# Patient Record
Sex: FEMALE | Race: WHITE | Employment: FULL TIME | URBAN - METROPOLITAN AREA
[De-identification: names, ages, dates, MRNs, and addresses within clinical notes are randomized per-mention and may not be internally consistent; named-entity substitution may affect disease eponyms.]

---

## 2017-01-26 ENCOUNTER — OFFICE VISIT (OUTPATIENT)
Dept: DERMATOLOGY CLINIC | Facility: CLINIC | Age: 42
End: 2017-01-26

## 2017-01-26 DIAGNOSIS — D48.5 NEOPLASM OF UNCERTAIN BEHAVIOR OF SKIN: Primary | ICD-10-CM

## 2017-01-26 PROCEDURE — 11100 BIOPSY OF SKIN LESION: CPT | Performed by: DERMATOLOGY

## 2017-01-26 PROCEDURE — 88305 TISSUE EXAM BY PATHOLOGIST: CPT | Performed by: DERMATOLOGY

## 2017-01-26 NOTE — PROGRESS NOTES
HPI:     Chief Complaint     Derm Problem        HPI     Derm Problem    Additional comments: crusty spot on face, left cheek has returned. Denies pain/itching, crusting gets worse as the day goes on. Normal moisturizer is only product.        Last edited of Onset   • Hypertension Mother 79   • Hypertension Father 79   • Gastro-Intestinal Disorder Mother      Diverticulosis   • Musculo-skelatal Disorder Mother      Hip replacement   • Cataracts Father    • Heart Disease Father      Coronary artery disease

## 2017-01-31 NOTE — PROGRESS NOTES
Quick Note:    1/26/2017 derm path report results logged in log book and letter sent to pt via [address] noting that lesion removed was benign and to f/u with [facility] as needed.   ______

## 2020-09-15 ENCOUNTER — OFFICE VISIT (OUTPATIENT)
Dept: OBGYN CLINIC | Facility: CLINIC | Age: 45
End: 2020-09-15
Payer: COMMERCIAL

## 2020-09-15 VITALS
SYSTOLIC BLOOD PRESSURE: 119 MMHG | HEART RATE: 51 BPM | BODY MASS INDEX: 25 KG/M2 | DIASTOLIC BLOOD PRESSURE: 79 MMHG | WEIGHT: 142 LBS

## 2020-09-15 DIAGNOSIS — N93.0 PCB (POST COITAL BLEEDING): ICD-10-CM

## 2020-09-15 DIAGNOSIS — Z01.419 ENCOUNTER FOR GYNECOLOGICAL EXAMINATION WITHOUT ABNORMAL FINDING: Primary | ICD-10-CM

## 2020-09-15 DIAGNOSIS — Z11.3 VENEREAL DISEASE SCREENING: ICD-10-CM

## 2020-09-15 DIAGNOSIS — Z12.31 SCREENING MAMMOGRAM, ENCOUNTER FOR: ICD-10-CM

## 2020-09-15 DIAGNOSIS — Z12.4 SCREENING FOR MALIGNANT NEOPLASM OF CERVIX: ICD-10-CM

## 2020-09-15 PROCEDURE — 99386 PREV VISIT NEW AGE 40-64: CPT | Performed by: OBSTETRICS & GYNECOLOGY

## 2020-09-15 PROCEDURE — 3074F SYST BP LT 130 MM HG: CPT | Performed by: OBSTETRICS & GYNECOLOGY

## 2020-09-15 PROCEDURE — 3078F DIAST BP <80 MM HG: CPT | Performed by: OBSTETRICS & GYNECOLOGY

## 2020-09-16 NOTE — PROGRESS NOTES
HPI:    Patient ID: Bridget Gaucher is a 39year old female. HPI  [id number] with monthly menses and condom for [facility]. She is in new relationship since January. He has no children. She wishes to discuss [facility] options. She has never used anything but condom.  She is no rash or lesion on the right labia. There is no rash or lesion on the left labia. Uterus is not enlarged and not tender. Cervix exhibits no motion tenderness and no discharge. Right adnexum displays no mass, no tenderness and no fullness.  Left adnexum

## 2020-09-17 LAB
C TRACH DNA SPEC QL NAA+PROBE: NEGATIVE
HPV I/H RISK 1 DNA SPEC QL NAA+PROBE: NEGATIVE
N GONORRHOEA DNA SPEC QL NAA+PROBE: NEGATIVE
T VAGINALIS RRNA SPEC QL NAA+PROBE: NEGATIVE

## 2020-09-18 NOTE — PROGRESS NOTES
Leanna Burr. You have negative pap and HPV test. The cervical STD screen is all negative as well. Continue yearly exam and do pap in 3 years.

## 2020-09-29 ENCOUNTER — PATIENT MESSAGE (OUTPATIENT)
Dept: OBGYN CLINIC | Facility: CLINIC | Age: 45
End: 2020-09-29

## 2020-09-30 NOTE — TELEPHONE ENCOUNTER
Pt requesting appt for Mirena insertion. Pt reports lmp 9/29/2020. Assisted pt with scheduling appt for 10/2/2020 at 1140am at [facility]. Advised pt to take 600 mg of Ibuprofen with food 1 hr prior to appt. Location information provided to pt.  Pt verbalized unde

## 2020-09-30 NOTE — TELEPHONE ENCOUNTER
From: Chelsie Chavez  To: Hernán Lubin. DO Evelyn  Sent: 9/29/2020 9:54 PM CDT  Subject: Visit Follow-up Question    My period began today. I'd like to schedule a time to come in for the IUD. Please advise.  Thank you

## 2021-04-09 DIAGNOSIS — Z23 NEED FOR VACCINATION: ICD-10-CM

## 2022-01-04 ENCOUNTER — IMMUNIZATION (OUTPATIENT)
Dept: LAB | Facility: HOSPITAL | Age: 47
End: 2022-01-04
Attending: EMERGENCY MEDICINE
Payer: COMMERCIAL

## 2022-01-04 DIAGNOSIS — Z23 NEED FOR VACCINATION: Primary | ICD-10-CM

## 2022-01-04 PROCEDURE — 0004A SARSCOV2 VAC 30MCG/0.3ML IM: CPT | Performed by: NURSE PRACTITIONER

## 2022-07-12 ENCOUNTER — E-VISIT (OUTPATIENT)
Dept: TELEHEALTH | Age: 47
End: 2022-07-12

## 2022-07-12 DIAGNOSIS — R05.9 COUGH IN ADULT: Primary | ICD-10-CM

## 2022-07-12 PROCEDURE — 99421 OL DIG E/M SVC 5-10 MIN: CPT | Performed by: NURSE PRACTITIONER

## 2022-07-12 RX ORDER — PREDNISONE 20 MG/1
40 TABLET ORAL DAILY
Qty: 10 TABLET | Refills: 0 | Status: SHIPPED | OUTPATIENT
Start: 2022-07-12 | End: 2022-07-17

## 2022-07-12 RX ORDER — BENZONATATE 200 MG/1
200 CAPSULE ORAL 3 TIMES DAILY PRN
Qty: 30 CAPSULE | Refills: 0 | Status: SHIPPED | OUTPATIENT
Start: 2022-07-12

## 2023-10-05 ENCOUNTER — E-VISIT (OUTPATIENT)
Dept: TELEHEALTH | Age: 48
End: 2023-10-05
Payer: COMMERCIAL

## 2023-10-05 DIAGNOSIS — J20.8 ACUTE VIRAL BRONCHITIS: Primary | ICD-10-CM

## 2023-10-05 PROCEDURE — 99422 OL DIG E/M SVC 11-20 MIN: CPT | Performed by: PHYSICIAN ASSISTANT

## 2023-10-05 RX ORDER — BENZONATATE 200 MG/1
200 CAPSULE ORAL 3 TIMES DAILY PRN
Qty: 30 CAPSULE | Refills: 0 | Status: SHIPPED | OUTPATIENT
Start: 2023-10-05

## 2023-10-05 RX ORDER — PREDNISONE 20 MG/1
40 TABLET ORAL DAILY
Qty: 10 TABLET | Refills: 0 | Status: SHIPPED | OUTPATIENT
Start: 2023-10-05 | End: 2023-10-10

## 2023-10-05 NOTE — PROGRESS NOTES
Sherly Mike is a 57year old female who initiated e-visit care today. HPI:   See answers to questionnaire submission     Current Outpatient Medications   Medication Sig Dispense Refill    benzonatate 200 MG Oral Cap Take 1 capsule (200 mg total) by mouth 3 (three) times daily as needed for cough. 30 capsule 0      Past Medical History:   Diagnosis Date    Pregnancy , 2011-induced ; 2011-, APGAR:9 (1 min) 9 (5 min)      Past Surgical History:   Procedure Laterality Date      2011    TONSILLECTOMY        Family History   Problem Relation Age of Onset    Hypertension Mother 79    Gastro-Intestinal Disorder Mother         Diverticulosis    Musculo-skelatal Disorder Mother         Hip replacement    Hypertension Father 79    Cataracts Father     Heart Disease Father         Coronary artery disease      Social History:  Social History     Socioeconomic History    Marital status: Single   Tobacco Use    Smoking status: Light Smoker     Years: 15     Types: Cigarettes     Last attempt to quit: 2011     Years since quittin.7    Tobacco comments:     .050 units per day   Substance and Sexual Activity    Alcohol use: Yes     Alcohol/week: 0.0 standard drinks of alcohol     Comment: socially    Sexual activity: Yes     Partners: Male     Birth control/protection: Condom   Other Topics Concern    Caffeine Concern Yes     Comment: coffee, soda/tea, 150 mg per day    Pt has a pacemaker No    Pt has a defibrillator No    Reaction to local anesthetic No         ASSESSMENT AND PLAN:       Diagnoses and all orders for this visit:    Acute viral bronchitis  -     predniSONE 20 MG Oral Tab; Take 2 tablets (40 mg total) by mouth daily for 5 days. -     benzonatate 200 MG Oral Cap; Take 1 capsule (200 mg total) by mouth 3 (three) times daily as needed for cough.            Duration of  the service:  13 minutes      See POWWOW message exchange and Patient Instructions for Comfort Care and patient education.

## 2024-11-22 ENCOUNTER — OFFICE VISIT (OUTPATIENT)
Dept: INTERNAL MEDICINE CLINIC | Facility: CLINIC | Age: 49
End: 2024-11-22
Payer: COMMERCIAL

## 2024-11-22 VITALS
SYSTOLIC BLOOD PRESSURE: 124 MMHG | WEIGHT: 184.13 LBS | HEART RATE: 64 BPM | BODY MASS INDEX: 33.04 KG/M2 | HEIGHT: 62.5 IN | RESPIRATION RATE: 16 BRPM | OXYGEN SATURATION: 97 % | TEMPERATURE: 98 F | DIASTOLIC BLOOD PRESSURE: 80 MMHG

## 2024-11-22 DIAGNOSIS — Z12.31 SCREENING MAMMOGRAM FOR BREAST CANCER: ICD-10-CM

## 2024-11-22 DIAGNOSIS — Z00.00 ANNUAL PHYSICAL EXAM: Primary | ICD-10-CM

## 2024-11-22 DIAGNOSIS — Z12.11 SCREENING FOR COLON CANCER: ICD-10-CM

## 2024-11-22 NOTE — PROGRESS NOTES
Subjective:   Unique Castillo is a 49 year old female who presents for Establish Care and Physical     Presents to establish care and have age-related health screenings performed.  She has not seen a primary physician in a few years.  Her last Pap was in 2020 -she does report a history of abnormal Pap, and typically is screened every 3 years.  She has regular monthly cycles. She has never had a mammogram.  She has not had a colonoscopy or any other form of colorectal cancer screening.    Generally feels well.  Lives with her 13-year-old.  Is not in a relationship/not sexually active.  Works for a [employer].     Her primary concern is that she has gained approximately 40lbs since 2020 with very little change in her lifestyle. She says she knows she could probably eat a healthier diet, and does not exercise.  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  She drinks 1 to 2 glasses of wine a few days per week.  Does not use tobacco or drugs.  Her older sister was diagnosed with thyroid cancer, and is status post thyroidectomy.  She does not have any cold intolerance, fatigue, menstrual irregularities, constipation, dry skin.    She does note that she found blood with wiping after a hard bowel movement 3 times in the past 4 months.  No external hemorrhoids.  She said the blood was only there with wiping, and went away immediately.  No associated abdominal pain, dizziness, pallor.    Sees dentist annually  -last visit was 3 months ago.  Has not had an eye exam in many years.  No vision issues.      History/Other:    Chief Complaint Reviewed and Verified  No Further Nursing Notes to   Review  Tobacco Reviewed  Allergies Reviewed  Medications Reviewed    Problem List Reviewed  Medical History Reviewed  Surgical History   Reviewed  OB Status Reviewed  Family History Reviewed  Social History   Reviewed         Tobacco:  She smoked tobacco in the past but quit greater than 12 months ago.  Social History      Tobacco Use   Smoking Status Former    Current packs/day: 0.00    Types: Cigarettes    Start date: 1996    Quit date: 2011    Years since quittin.9   Smokeless Tobacco Never        No current outpatient medications on file.         Review of Systems:  Review of Systems  10 point review of systems otherwise negative with the exception of HPI and assessment and plan.    Objective:   /80 (BP Location: Left arm, Patient Position: Sitting, Cuff Size: large)   Pulse 64   Temp 97.7 °F (36.5 °C) (Oral)   Resp 16   Ht 5' 2.5\" (1.588 m)   Wt 184 lb 2 oz (83.5 kg)   LMP 10/29/2024 (Approximate)   SpO2 97%   BMI 33.14 kg/m²  Estimated body mass index is 33.14 kg/m² as calculated from the following:    Height as of this encounter: 5' 2.5\" (1.588 m).    Weight as of this encounter: 184 lb 2 oz (83.5 kg).      Physical Exam  Vitals reviewed.   Constitutional:       Appearance: Normal appearance.   HENT:      Head: Normocephalic.      Right Ear: Tympanic membrane normal.      Left Ear: Tympanic membrane normal.      Nose: Nose normal.      Mouth/Throat:      Mouth: Mucous membranes are moist.      Pharynx: No posterior oropharyngeal erythema.   Eyes:      Extraocular Movements: Extraocular movements intact.      Conjunctiva/sclera: Conjunctivae normal.      Pupils: Pupils are equal, round, and reactive to light.   Neck:      Thyroid: No thyroid mass, thyromegaly or thyroid tenderness.   Cardiovascular:      Heart sounds: S1 normal and S2 normal. No murmur heard.  Pulmonary:      Effort: Pulmonary effort is normal.      Breath sounds: Normal breath sounds.   Abdominal:      General: Bowel sounds are normal.      Palpations: Abdomen is soft.      Tenderness: There is no abdominal tenderness.   Musculoskeletal:      Cervical back: Normal range of motion.      Right lower leg: No edema.      Left lower leg: No edema.   Lymphadenopathy:      Cervical: No cervical adenopathy.      Upper Body:      Right  upper body: No supraclavicular adenopathy.      Left upper body: No supraclavicular adenopathy.   Skin:     General: Skin is warm and dry.      Capillary Refill: Capillary refill takes less than 2 seconds.   Neurological:      General: No focal deficit present.      Mental Status: She is alert and oriented to person, place, and time.   Psychiatric:         Mood and Affect: Mood and affect normal.         Speech: Speech normal.         Assessment & Plan:   1. Annual physical exam (Primary)  -     [facility] Telephone Colon Screen; Future; Expected date: 11/29/2024  -     CBC With Differential With Platelet; Future; Expected date: 11/22/2024  -     Comp Metabolic Panel (14); Future; Expected date: 11/22/2024  -     Lipid Panel; Future; Expected date: 11/22/2024  -     TSH W Reflex To Free T4; Future; Expected date: 11/22/2024  -     [facility] DANILO 2D+3D SCREENING BILAT (CPT=77067/67774); Future; Expected date: 11/22/2024  - Annual screening labs (instructed to fast for these labs)  - Regular exercise (150 min/week) and try to eat healthy diet.  - Biannual dental exams and annual vision exam.  - Further disposition pending lab results  - Return in approximately 1 year for annual physical.    2. Screening for colon cancer  -     [facility] Telephone Colon Screen; Future; Expected date: 11/29/2024    3. Screening mammogram for breast cancer  -     [facility] DANILO 2D+3D SCREENING BILAT (CPT=77067/16010); Future; Expected date: 11/22/2024        Return pending lab results.    ALEXANDR Radford, 11/22/2024, 1:15 PM

## 2024-12-22 ENCOUNTER — APPOINTMENT (OUTPATIENT)
Dept: GENERAL RADIOLOGY | Age: 49
End: 2024-12-22
Attending: NURSE PRACTITIONER
Payer: COMMERCIAL

## 2024-12-22 ENCOUNTER — HOSPITAL ENCOUNTER (OUTPATIENT)
Age: 49
Discharge: HOME | End: 2024-12-22
Payer: COMMERCIAL

## 2024-12-22 VITALS
RESPIRATION RATE: 20 BRPM | TEMPERATURE: 99 F | OXYGEN SATURATION: 98 % | HEART RATE: 75 BPM | SYSTOLIC BLOOD PRESSURE: 138 MMHG | DIASTOLIC BLOOD PRESSURE: 86 MMHG

## 2024-12-22 DIAGNOSIS — S92.352A CLOSED DISPLACED FRACTURE OF FIFTH METATARSAL BONE OF LEFT FOOT, INITIAL ENCOUNTER: Primary | ICD-10-CM

## 2024-12-22 PROCEDURE — 73610 X-RAY EXAM OF ANKLE: CPT | Performed by: NURSE PRACTITIONER

## 2024-12-22 PROCEDURE — 73630 X-RAY EXAM OF FOOT: CPT | Performed by: NURSE PRACTITIONER

## 2024-12-22 PROCEDURE — 29515 APPLICATION SHORT LEG SPLINT: CPT

## 2024-12-22 PROCEDURE — 99203 OFFICE O/P NEW LOW 30 MIN: CPT

## 2024-12-22 PROCEDURE — 99214 OFFICE O/P EST MOD 30 MIN: CPT

## 2024-12-22 NOTE — DISCHARGE INSTRUCTIONS
Follow up with orthopedics as given  Call in AM for soonest appt. Let them know you were in Immediate care and have a foot fracture and need an appt.    Take ibuprofen 600mg every 6 hours for pain and swelling.  Alternate with tylenol 1000mg every 6 hours for pain.    Use ICE 15 min on 2 hours off on area of most pain/swelling.  Elevate when at home, resting.    Keep post mold on clean and dry until seen by orthopedics.  Use crutches daily to ambulate, non weightbearing on affected leg!    GO TO ED for pain out of proportion despite medication use, fevers > 101, worsening swelling or redness, chest pain or shortness of breath, dizziness

## 2024-12-22 NOTE — ED PROVIDER NOTES
Patient Seen in: [facility] [place]      History     Chief Complaint   Patient presents with    Fracture     Fell and think my foot is broken - Entered by patient     Stated Complaint: Fracture - Fell and think my foot is broken    Subjective:   HPI    49-year-old female here for evaluation of left foot and ankle bruising swelling and pain that started after missing a step and falling twisting her foot.  She denies numbness or tingling but reports increased pain and stiffness in the last 12 hours.  She took Tylenol last night for pain but nothing this morning.  She is unable to weight-bear.  Denies previous injury or surgery to this foot in the past    Objective:     Past Medical History:    Pregnancy (HCC)    -induced ; 2011-, APGAR:9 (1 min) 9 (5 min)              Past Surgical History:   Procedure Laterality Date      2011    Tonsillectomy                  Social History     Socioeconomic History    Marital status: Single   Tobacco Use    Smoking status: Former     Current packs/day: 0.00     Types: Cigarettes     Start date: 1996     Quit date: 2011     Years since quittin.9    Smokeless tobacco: Never   Vaping Use    Vaping status: Never Used   Substance and Sexual Activity    Alcohol use: Yes     Alcohol/week: 6.0 standard drinks of alcohol     Types: 6 Glasses of wine per week    Drug use: Never    Sexual activity: Not Currently     Partners: Male   Other Topics Concern    Caffeine Concern Yes     Comment: coffee, soda/tea, 150 mg per day    Pt has a pacemaker No    Pt has a defibrillator No    Reaction to local anesthetic No              Review of Systems    Positive for stated complaint: Fracture - Fell and think my foot is broken  Other systems are as noted in HPI.  Constitutional and vital signs reviewed.      All other systems reviewed and negative except as noted above.    Physical Exam     ED Triage Vitals [24 0910]   /86   Pulse 75   Resp  20   Temp 99.1 °F (37.3 °C)   Temp src Oral   SpO2 98 %   O2 Device None (Room air)       Current Vitals:   Vital Signs  BP: 138/86  Pulse: 75  Resp: 20  Temp: 99.1 °F (37.3 °C)  Temp src: Oral    Oxygen Therapy  SpO2: 98 %  O2 Device: None (Room air)        Physical Exam  Vitals and nursing note reviewed.   Constitutional:       General: She is not in acute distress.     Appearance: Normal appearance. She is not ill-appearing, toxic-appearing or diaphoretic.   Cardiovascular:      Rate and Rhythm: Normal rate.      Pulses: Normal pulses.   Pulmonary:      Effort: Pulmonary effort is normal. No respiratory distress.   Musculoskeletal:      Left knee: Normal. No swelling or bony tenderness. Normal range of motion.      Left lower leg: Normal. No swelling or tenderness.      Left ankle: Swelling and ecchymosis present. Tenderness present over the lateral malleolus. No medial malleolus, base of 5th metatarsal or proximal fibula tenderness. Decreased range of motion. Anterior drawer test negative. Normal pulse.      Left foot: Decreased range of motion. Normal capillary refill. Swelling, tenderness and bony tenderness present. No deformity or crepitus. Normal pulse.        Feet:    Neurological:      Mental Status: She is alert and oriented to person, place, and time.   Psychiatric:         Mood and Affect: Mood normal.         Behavior: Behavior normal.           ED Course   Labs Reviewed - No data to display    ED Course as of 12/22/24 1010  ------------------------------------------------------------  Time: 12/22 0947  Value: XR FOOT, COMPLETE (MIN 3 VIEWS), LEFT (CPT=87026)  Comment: Impression  CONCLUSION:  1. Acute comminuted mildly displaced fracture of the 5th metatarsal shaft.  2. No other significant finding.    ------------------------------------------------------------  Time: 12/22 0947  Value: XR ANKLE (MIN 3 VIEWS), LEFT (CPT=99660)  Comment: Impression  CONCLUSION:  1. Fifth metatarsal fracture.  2. No  ankle fracture.             MDM     49-year-old female here for evaluation of left foot and ankle pain swelling bruising that started after missing a step yesterday and falling.    Exam patient well-appearing otherwise left foot noted with ecchymosis swelling to lateral foot and lateral malleolus.  Patient has painful range of motion to foot and ankle.  Pedal pulses present there is no obvious wounds noted.    Differential diagnoses reflecting the complexity of care include but are not limited to foot fracture, foot sprain, ankle fracture ankle sprain.    Comorbidities that add complexity to management include: None  History obtained by an independent source was from: Patient  My independent interpretations of studies include: X-ray =    Shared decision making was done by: Patient myself  Discussions of management was done with: Patient  Patient is well appearing, non-toxic and in no acute distress.  Vital signs are stable.     Discussed left foot fracture fifth metatarsal with mild replacement.  Discussed postmold and crutches nonweightbearing.  Tylenol Motrin as needed for pain ice elevation.  Advised on calling for follow-up appointment with orthopedics tomorrow morning.    Short leg postmold placed by tech, CMS intact prior to and after placement.  Crutch teaching performed by tech.  Patient stable nontoxic for discharge home  All questions answered. Return and ER precautions given.    Counseled: Patient, regarding diagnosis, regarding treatment plan, regarding diagnostic results, regarding prescription, I have discussed with the patient the results of tests, differential diagnosis, and warning signs and symptoms that should prompt immediate return. The patient understands these instructions and agrees to the follow-up plan provided. There is no barriers to learning. Appropriate f/u given. Patient agrees to return for any concerns/ problems/complications.          Medical Decision Making      Disposition and  Plan     Clinical Impression:  1. Closed displaced fracture of fifth metatarsal bone of left foot, initial encounter         Disposition:  Discharge  12/22/2024 10:01 am    Follow-up:  Kanu Gibbs MD  [address]  [place] [address] [phone]    Call in 1 day  Call tomorrow morning for an appt    Amy White DO  [address]  [place] [id number] [phone]    Call in 1 day            Medications Prescribed:  There are no discharge medications for this patient.          Supplementary Documentation:

## 2024-12-22 NOTE — ED INITIAL ASSESSMENT (HPI)
Presents in wheelchair. States she missed a step last night and fell injuring LLE. Pain, bruising, and swelling noted to left dorsal foot. Swelling to lateral left ankle. Painful weight bearing.

## 2025-03-11 ENCOUNTER — OFFICE VISIT (OUTPATIENT)
Dept: INTERNAL MEDICINE CLINIC | Facility: CLINIC | Age: 50
End: 2025-03-11
Payer: COMMERCIAL

## 2025-03-11 VITALS
OXYGEN SATURATION: 99 % | BODY MASS INDEX: 33 KG/M2 | SYSTOLIC BLOOD PRESSURE: 116 MMHG | HEIGHT: 62.5 IN | DIASTOLIC BLOOD PRESSURE: 78 MMHG | HEART RATE: 81 BPM | TEMPERATURE: 98 F

## 2025-03-11 DIAGNOSIS — J02.9 SORE THROAT: Primary | ICD-10-CM

## 2025-03-11 LAB
CONTROL LINE PRESENT WITH A CLEAR BACKGROUND (YES/NO): YES YES/NO
KIT LOT #: NORMAL NUMERIC
STREP GRP A CUL-SCR: NEGATIVE

## 2025-03-11 PROCEDURE — 3008F BODY MASS INDEX DOCD: CPT | Performed by: NURSE PRACTITIONER

## 2025-03-11 PROCEDURE — 99213 OFFICE O/P EST LOW 20 MIN: CPT | Performed by: NURSE PRACTITIONER

## 2025-03-11 PROCEDURE — 3078F DIAST BP <80 MM HG: CPT | Performed by: NURSE PRACTITIONER

## 2025-03-11 PROCEDURE — 3074F SYST BP LT 130 MM HG: CPT | Performed by: NURSE PRACTITIONER

## 2025-03-11 PROCEDURE — 87880 STREP A ASSAY W/OPTIC: CPT | Performed by: NURSE PRACTITIONER

## 2025-03-11 NOTE — PROGRESS NOTES
Subjective:   Unique Castillo is a 49 year old female presents for Sore Throat     Sore throat started 2 nights ago. Low grade temp yesterday. Post-nasal drip. Congestion initially, now runny nose.  No cough.    S/p tonsillectomy as an adult. Feels like strep. Pain is localized to R throat.    No abd pain, n/v/d.    Took Nyquil last night to sleep - helped.  Took Theraflu yesterday with some relief.        History/Other:    Chief Complaint Reviewed and Verified  No Further Nursing Notes to   Review  Tobacco Reviewed  Allergies Reviewed  Medications Reviewed    Problem List Reviewed  Medical History Reviewed  Surgical History   Reviewed  OB Status Reviewed  Family History Reviewed  Social History   Reviewed         Tobacco:  She smoked tobacco in the past but quit greater than 12 months ago.  Social History     Tobacco Use   Smoking Status Former    Current packs/day: 0.00    Types: Cigarettes    Start date: 1996    Quit date: 2011    Years since quittin.2   Smokeless Tobacco Never        No current outpatient medications on file.         Review of Systems:  Review of Systems  10 point review of systems otherwise negative with the exception of HPI and assessment and plan.    Objective:   /78   Pulse 81   Temp 97.5 °F (36.4 °C) (Temporal)   Ht 5' 2.5\" (1.588 m)   LMP 2025 (Approximate)   SpO2 99%   BMI 33.14 kg/m²  Estimated body mass index is 33.14 kg/m² as calculated from the following:    Height as of this encounter: 5' 2.5\" (1.588 m).    Weight as of 24: 184 lb 2 oz (83.5 kg).      Physical Exam  Vitals reviewed.   Constitutional:       General: She is not in acute distress.     Appearance: She is not ill-appearing.   HENT:      Right Ear: Tympanic membrane normal.      Left Ear: Tympanic membrane normal.      Nose: Rhinorrhea present.      Right Turbinates: Enlarged.      Left Turbinates: Enlarged.      Mouth/Throat:      Mouth: Mucous membranes are moist.       Pharynx: Posterior oropharyngeal erythema and postnasal drip present. No oropharyngeal exudate.      Tonsils: 0 on the right. 0 on the left.   Eyes:      Conjunctiva/sclera: Conjunctivae normal.   Cardiovascular:      Rate and Rhythm: Normal rate and regular rhythm.      Heart sounds: Normal heart sounds.   Pulmonary:      Effort: Pulmonary effort is normal. No respiratory distress.      Breath sounds: Normal breath sounds.   Musculoskeletal:      Cervical back: Neck supple.   Lymphadenopathy:      Cervical: No cervical adenopathy.   Skin:     General: Skin is warm and dry.   Neurological:      Mental Status: She is alert.         Assessment & Plan:   1. Sore throat (Primary)  -     Strep A Assay W/Optic  -     Grp A Strep Cult, Throat; Future; Expected date: 03/11/2025  -     Grp A Strep Cult, Throat  - Strep screen neg.   - Discussed typical viral course and supportive care/symptom management at home with decongestant of choice, tylenol or motrin for throat pain. Monitor throat pain - if worsens, she has difficulty swallowing, she should be re-evaluated right away.   - Strep cx sent.        Return if symptoms worsen or fail to improve.    ALEXANDR Radford, 3/11/2025, 2:29 PM     This note was prepared using Dragon Medical voice recognition dictation software. As a result errors may occur. When identified, these errors have been corrected. While every attempt is made to correct errors during dictation discrepancies may still exist.

## 2025-03-11 NOTE — PATIENT INSTRUCTIONS
Typical viral illness can last 7-10 days with cough that can linger for 1-2 weeks.    Over the counter symptom management:  Try Saline nasal spray or Fluticasone (Flonase) 1 spray each nostril twice daily for congestion.    Over the counter decongestant of your choice - i.e. mucinex-D, claritin-D, or combination medications like dayquil/nyquil.    For cough: [employer] Naturals, Zarbees, Robitussin, Guaifenesin    Acetaminophen every 4-6 hours or Ibuprofen every 6-8 hours for pain/fever.     Drink plenty of fluids. Tea with honey, honey-based throat lozenges,    Humidifier in bedroom.

## 2025-04-17 DIAGNOSIS — Z47.89 ORTHOPEDIC AFTERCARE: Primary | ICD-10-CM

## 2025-04-21 ENCOUNTER — HOSPITAL ENCOUNTER (OUTPATIENT)
Dept: GENERAL RADIOLOGY | Facility: HOSPITAL | Age: 50
Discharge: HOME | End: 2025-04-21
Payer: COMMERCIAL

## 2025-04-21 DIAGNOSIS — Z47.89 ORTHOPEDIC AFTERCARE: ICD-10-CM

## 2025-04-21 PROCEDURE — 73630 X-RAY EXAM OF FOOT: CPT

## 2025-05-09 ENCOUNTER — HOSPITAL ENCOUNTER (OUTPATIENT)
Dept: MAMMOGRAPHY | Facility: HOSPITAL | Age: 50
Discharge: HOME | End: 2025-05-09
Attending: Nurse Practitioner
Payer: COMMERCIAL

## 2025-05-09 DIAGNOSIS — Z00.00 ANNUAL PHYSICAL EXAM: ICD-10-CM

## 2025-05-09 DIAGNOSIS — Z12.31 SCREENING MAMMOGRAM FOR BREAST CANCER: ICD-10-CM

## 2025-05-09 PROCEDURE — 77067 SCR MAMMO BI INCL CAD: CPT | Performed by: NURSE PRACTITIONER

## 2025-05-09 PROCEDURE — 77063 BREAST TOMOSYNTHESIS BI: CPT | Performed by: NURSE PRACTITIONER

## 2025-05-30 ENCOUNTER — HOSPITAL ENCOUNTER (OUTPATIENT)
Dept: MAMMOGRAPHY | Facility: HOSPITAL | Age: 50
Discharge: HOME | End: 2025-05-30
Attending: Nurse Practitioner
Payer: COMMERCIAL

## 2025-05-30 DIAGNOSIS — R92.8 ABNORMAL MAMMOGRAM: ICD-10-CM

## 2025-05-30 PROCEDURE — 77062 BREAST TOMOSYNTHESIS BI: CPT | Performed by: NURSE PRACTITIONER

## 2025-05-30 PROCEDURE — 77066 DX MAMMO INCL CAD BI: CPT | Performed by: NURSE PRACTITIONER

## (undated) NOTE — MR AVS SNAPSHOT
After Visit Summary   9/15/2020    Charlotta Kawasaki    MRN: [id number]           Visit Information     Date & Time  9/15/2020  3:00 PM Provider  Constance Marmolejo  Department  [facility] [place], [place] Dept.  Phone  [phone] JAY CARRASCO 2D+3D SCREENING BILAT (CPT=77067/52777)    Instructions: Your order will generate a \"Scheduling Ticket\" that will be available in [employer] My Mobile to schedule on your own at a time most convenient to you.   Please note, it will take approximately 2 hours Treatment for mild illness or injury that does not require immediate attention.  Average cost  $70*   [facility]  Monday – Friday  8:00 am – 8:00 pm   Saturday – Sunday  8:00 am – 4:00 pm    WALK[facility]IN CARE  Primary Care

## (undated) NOTE — MR AVS SNAPSHOT
[facility] SPECIALTY [facility] [place] [address] [address] [address] [phone]               Thank you for choosing us for your health care visit with Salvador Murphy MD.  We are glad to serve you and happy to provide you with this summary of y Call [phone] for help. popchipst is NOT to be used for urgent needs. For medical emergencies, dial 911.            Visit [facility] online at  [email or web address]